# Patient Record
Sex: FEMALE | Race: WHITE
[De-identification: names, ages, dates, MRNs, and addresses within clinical notes are randomized per-mention and may not be internally consistent; named-entity substitution may affect disease eponyms.]

---

## 2018-09-26 ENCOUNTER — HOSPITAL ENCOUNTER (INPATIENT)
Dept: HOSPITAL 96 - M.ERS | Age: 53
LOS: 1 days | Discharge: HOME | DRG: 603 | End: 2018-09-27
Attending: INTERNAL MEDICINE | Admitting: INTERNAL MEDICINE
Payer: MEDICAID

## 2018-09-26 VITALS — HEIGHT: 64.02 IN | WEIGHT: 215 LBS | BODY MASS INDEX: 36.7 KG/M2

## 2018-09-26 VITALS — DIASTOLIC BLOOD PRESSURE: 52 MMHG | SYSTOLIC BLOOD PRESSURE: 109 MMHG

## 2018-09-26 VITALS — DIASTOLIC BLOOD PRESSURE: 74 MMHG | SYSTOLIC BLOOD PRESSURE: 132 MMHG

## 2018-09-26 VITALS — DIASTOLIC BLOOD PRESSURE: 63 MMHG | SYSTOLIC BLOOD PRESSURE: 131 MMHG

## 2018-09-26 DIAGNOSIS — Z53.29: ICD-10-CM

## 2018-09-26 DIAGNOSIS — Z88.0: ICD-10-CM

## 2018-09-26 DIAGNOSIS — F17.210: ICD-10-CM
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
## 2018-09-26 DIAGNOSIS — Z88.2: ICD-10-CM

## 2018-09-26 DIAGNOSIS — Z90.722: ICD-10-CM

## 2018-09-26 DIAGNOSIS — I10: ICD-10-CM
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
## 2018-09-26 DIAGNOSIS — L03.115: Primary | ICD-10-CM

## 2018-09-26 DIAGNOSIS — Z87.440: ICD-10-CM

## 2018-09-26 DIAGNOSIS — Z90.710: ICD-10-CM

## 2018-09-26 DIAGNOSIS — Z79.4: ICD-10-CM

## 2018-09-26 DIAGNOSIS — Z79.82: ICD-10-CM

## 2018-09-26 DIAGNOSIS — Z91.013: ICD-10-CM

## 2018-09-26 DIAGNOSIS — Z91.041: ICD-10-CM

## 2018-09-26 DIAGNOSIS — S00.83XA: ICD-10-CM

## 2018-09-26 DIAGNOSIS — R65.10: ICD-10-CM

## 2018-09-26 DIAGNOSIS — Z88.8: ICD-10-CM

## 2018-09-26 DIAGNOSIS — E87.1: ICD-10-CM

## 2018-09-26 DIAGNOSIS — M54.9: ICD-10-CM

## 2018-09-26 DIAGNOSIS — E10.65: ICD-10-CM

## 2018-09-26 DIAGNOSIS — G89.29: ICD-10-CM

## 2018-09-26 DIAGNOSIS — Z86.14: ICD-10-CM

## 2018-09-26 DIAGNOSIS — Z88.1: ICD-10-CM

## 2018-09-26 DIAGNOSIS — E03.9: ICD-10-CM

## 2018-09-26 DIAGNOSIS — G40.909: ICD-10-CM
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
## 2018-09-26 DIAGNOSIS — Z79.899: ICD-10-CM

## 2018-09-26 LAB
ABSOLUTE BASOPHILS: 0 THOU/UL (ref 0–0.2)
ABSOLUTE EOSINOPHILS: 0.1 THOU/UL (ref 0–0.7)
ABSOLUTE MONOCYTES: 0.6 THOU/UL (ref 0–1.2)
ALBUMIN SERPL-MCNC: 2.1 G/DL (ref 3.4–5)
ALP SERPL-CCNC: 165 U/L (ref 46–116)
ALT SERPL-CCNC: 29 U/L (ref 30–65)
ANION GAP SERPL CALC-SCNC: 6 MMOL/L (ref 7–16)
AST SERPL-CCNC: 27 U/L (ref 15–37)
BASOPHILS NFR BLD AUTO: 0.2 %
BILIRUB SERPL-MCNC: 1.8 MG/DL
BILIRUB UR-MCNC: NEGATIVE MG/DL
BUN SERPL-MCNC: 25 MG/DL (ref 7–18)
CALCIUM SERPL-MCNC: 9.4 MG/DL (ref 8.5–10.1)
CHLORIDE SERPL-SCNC: 97 MMOL/L (ref 98–107)
CO2 SERPL-SCNC: 31 MMOL/L (ref 21–32)
COLOR UR: YELLOW
CREAT SERPL-MCNC: 1.6 MG/DL (ref 0.6–1.3)
EOSINOPHIL NFR BLD: 1.4 %
GLUCOSE SERPL-MCNC: 396 MG/DL (ref 70–99)
GRANULOCYTES NFR BLD MANUAL: 75.8 %
HCT VFR BLD CALC: 41.2 % (ref 37–47)
HGB BLD-MCNC: 14.3 GM/DL (ref 12–15)
KETONES UR STRIP-MCNC: NEGATIVE MG/DL
LYMPHOCYTES # BLD: 1.7 THOU/UL (ref 0.8–5.3)
LYMPHOCYTES NFR BLD AUTO: 16.5 %
MCH RBC QN AUTO: 31.9 PG (ref 26–34)
MCHC RBC AUTO-ENTMCNC: 34.7 G/DL (ref 28–37)
MCV RBC: 92.1 FL (ref 80–100)
MONOCYTES NFR BLD: 6.1 %
MPV: 7.8 FL. (ref 7.2–11.1)
NEUTROPHILS # BLD: 7.6 THOU/UL (ref 1.6–8.1)
NUCLEATED RBCS: 0 /100WBC
OPIATES UR-MCNC: POSITIVE NG/ML
PLATELET COUNT*: 134 THOU/UL (ref 150–400)
POTASSIUM SERPL-SCNC: 4.2 MMOL/L (ref 3.5–5.1)
PROT SERPL-MCNC: 7.1 G/DL (ref 6.4–8.2)
PROT UR QL STRIP: NEGATIVE
RBC # BLD AUTO: 4.47 MIL/UL (ref 4.2–5)
RBC # UR STRIP: (no result) /UL
RDW-CV: 14.1 % (ref 10.5–14.5)
SODIUM SERPL-SCNC: 134 MMOL/L (ref 136–145)
SP GR UR STRIP: 1.01 (ref 1–1.03)
URINE CLARITY: CLEAR
URINE GLUCOSE-RANDOM: (no result)
URINE LEUKOCYTES-REFLEX: NEGATIVE
URINE NITRITE-REFLEX: NEGATIVE
UROBILINOGEN UR STRIP-ACNC: 0.2 E.U./DL (ref 0.2–1)
WBC # BLD AUTO: 10 THOU/UL (ref 4–11)

## 2018-09-26 NOTE — NUR
ADMITTED TO [facility] THIS AFTERNOON, ORIENTED TO ROOM AND CALL LIGHT.  UA/UDS SENT
TO LAB, ELEVATED LACTATE REPORTED TO SHANTEL, ONE LITER GIVEN, FRANCO IV VANC
WELL W/O S/S ADR. VSS, CARE PLAN REVIEWED WITH PATIENT, DENIES QUESTIONS AT
THIS TIME.

## 2018-09-27 VITALS — DIASTOLIC BLOOD PRESSURE: 49 MMHG | SYSTOLIC BLOOD PRESSURE: 101 MMHG

## 2018-09-27 VITALS — DIASTOLIC BLOOD PRESSURE: 41 MMHG | SYSTOLIC BLOOD PRESSURE: 103 MMHG

## 2018-09-27 VITALS — SYSTOLIC BLOOD PRESSURE: 101 MMHG | DIASTOLIC BLOOD PRESSURE: 49 MMHG

## 2018-09-27 LAB
ABSOLUTE BASOPHILS: 0.1 THOU/UL (ref 0–0.2)
ABSOLUTE EOSINOPHILS: 0.4 THOU/UL (ref 0–0.7)
ABSOLUTE MONOCYTES: 0.9 THOU/UL (ref 0–1.2)
ANION GAP SERPL CALC-SCNC: 3 MMOL/L (ref 7–16)
BASOPHILS NFR BLD AUTO: 0.9 %
BUN SERPL-MCNC: 23 MG/DL (ref 7–18)
CALCIUM SERPL-MCNC: 8.3 MG/DL (ref 8.5–10.1)
CHLORIDE SERPL-SCNC: 104 MMOL/L (ref 98–107)
CO2 SERPL-SCNC: 31 MMOL/L (ref 21–32)
CREAT SERPL-MCNC: 1.3 MG/DL (ref 0.6–1.3)
EOSINOPHIL NFR BLD: 3.4 %
GLUCOSE SERPL-MCNC: 86 MG/DL (ref 70–99)
GRANULOCYTES NFR BLD MANUAL: 65.7 %
HCT VFR BLD CALC: 38.7 % (ref 37–47)
HGB BLD-MCNC: 13.2 GM/DL (ref 12–15)
LYMPHOCYTES # BLD: 2.8 THOU/UL (ref 0.8–5.3)
LYMPHOCYTES NFR BLD AUTO: 22.9 %
MCH RBC QN AUTO: 31.3 PG (ref 26–34)
MCHC RBC AUTO-ENTMCNC: 34.1 G/DL (ref 28–37)
MCV RBC: 91.7 FL (ref 80–100)
MONOCYTES NFR BLD: 7.1 %
MPV: 7.8 FL. (ref 7.2–11.1)
NEUTROPHILS # BLD: 8.1 THOU/UL (ref 1.6–8.1)
NUCLEATED RBCS: 0 /100WBC
PLATELET COUNT*: 126 THOU/UL (ref 150–400)
POTASSIUM SERPL-SCNC: 3.7 MMOL/L (ref 3.5–5.1)
RBC # BLD AUTO: 4.22 MIL/UL (ref 4.2–5)
RDW-CV: 14.2 % (ref 10.5–14.5)
SODIUM SERPL-SCNC: 138 MMOL/L (ref 136–145)
WBC # BLD AUTO: 12.3 THOU/UL (ref 4–11)

## 2018-09-27 NOTE — NUR
PATIENT HAS BEEN ALERT AND ORIENTED TODAY VERY PLEASANT, SOME CONCERNS WITH
GOING HOME, "HAS THINGS TO DO AT HOME." PATIENT HAS PAIN THAT IS CONTROLLED
WITH ORAL PAIN MEDICATIONS. PATIENT IS BEING DISCHARGED TO HOME. DISCHARGE
INSTRUCTIONS AND PRESCRIPTIONS GIVEN, WITH QUESTIONS ANSWERED. LEFT VIA
WHEELCHAIR TO HOME.

## 2018-09-27 NOTE — NUR
PATIENT SLEPT MOST OF THE NIGHT. IV FLUIDS CONTINUE TO INFUSE AT 80 ML/HR.
PATIENT WAS GIVEN PAIN MEDICINE THREE TIMES THIS SHIFT. WILL CONTINUE TO
MONITOR.
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
## 2018-09-28 NOTE — CON
[address]  [address]                    CONSULTATION                  
_______________________________________________________________________________
 
Name:       KATELIN KHAN        Room:           [address] [place]  
..#:  [id number]      Account #:      [id number]  
Admission:  09/26/18     Attend Phys:    Rolando Harvey MD 
Discharge:  09/27/18     Date of Birth:  [birth year]  
         Report #: [id number]
                                                                     [id number]  
_______________________________________________________________________________
THIS REPORT FOR:  //name//                      
 
CC: Rolando Modi [place]
 
DATE OF SERVICE:  09/27/2018
 
 
Infectious Disease Consultation
 
ATTENDING PHYSICIAN:  Rolando Harvey MD
 
REASON FOR EVALUATION:  Right lower extremity skin and soft tissue infection
with cellulitis.
 
HISTORY OF PRESENT ILLNESS:  Chart reviewed, patient examined.  This is a
53-year-old woman with diabetes mellitus type 1 diagnosed at age 17, has ongoing
issues with right lower extremity inflammatory eruption.  She was hospitalized
recently, was discharged on IV vancomycin.  She sustained an injury result of a
fall forward, striking her pretibial site developed an ulceration, worsened
pain, inflammatory red signs and symptoms.  She was continued on IV antibiotics.
 It is unclear if she has had fevers or significant pulmonary or
gastrointestinal related complaints.  She is not encephalopathic.
 
ALLERGIES:  Numerous including CONTRAST DYE, TRAMADOL, PENICILLIN, SULFA,
HALDOL, TETRACYCLINE, TRAMADOL, KETOROLAC.
 
CURRENT MEDICINES:  Include guaifenesin, spironolactone, aspirin, levothyroxine,
pantoprazole, vancomycin, budesonide, rifaximin, analgesics, and antiemetics.
 
PAST MEDICAL HISTORY:  Diabetes mellitus type 1, history of recurrent urinary
tract infections, hypothyroidism, hypertension, renal insufficiency, chronic
back pain, seizures, and hernia repair.
 
SOCIAL HISTORY:  A 25 pack-year smoker.  No ethanol, no illicit drug use.
 
FAMILY HISTORY:  Noncontributory.
 
REVIEW OF SYSTEMS:  As above.
 
PHYSICAL EXAMINATION:
GENERAL:  Mild-to-moderate distress secondary to the left lower extremity pain,
appears somewhat chronically ill.  She is pleasant and cooperative, moderate
distress.
VITAL SIGNS:  Temperature 98.5, pulse 69, respirations 16, blood pressure
101/49.
 
 
 
[place] [address]                    CONSULTATION                  
_______________________________________________________________________________
 
Name:       KATELIN KHAN        Room:           [address]#:  [id number]      Account #:      [id number]  
Admission:  09/26/18     Attend Phys:    Rolando Harvey MD 
Discharge:  09/27/18     Date of Birth:  [birth year]  
         Report #: [id number]
                                                                     [id number]  
_______________________________________________________________________________
SKIN:  Warm, dry.
HEENT:  Unremarkable.
NECK:  Supple.
LUNGS:  Clear to auscultation.
HEART:  Regular.  I do not appreciate murmur.
ABDOMEN:  Soft, obese, nontender.
EXTREMITIES:  Right lower extremity has moderate-to-marked inflammatory changes
noted to below the knee.  She has got an ulceration involving the junction of
the distal medial third of her pretibial site anterior aspect.  There is
associated tenderness.  There is eschar.
GENITOURINARY:  Deferred.
RECTAL:  Deferred.
 
LABORATORY DATA:  Blood cultures sterile thus far.  Most recent CBC:  White
count 12.3, hemoglobin 13.2, hematocrit 38.7, platelets of 126.  Electrolytes: 
Sodium 138, potassium 3.7, chloride 104, bicarbonate is 31, anion gap of 3, BUN
and creatinine 23 and 1.3.  Urine culture was unremarkable.  Lactic peaked at
3.0 initially.
 
IMAGING DATA:  X-ray of the tib-fib was otherwise unremarkable for any osseous
abnormalities.
 
ASSESSMENT:  Right lower extremity skin and soft tissue infection, cellulitis. 
The patient had multiple hypersensitivities.  We will continue therapy with
linezolid.  At this point we will; however, add compression to the right lower
extremity.  She was encouraged to stay off her feet as much as possible.  I
guess given her social situation, she really needs to be discharged.  She does
assure me that there is people around that could attend to some of her needs. 
We will see her in the office in followup.
 
 
 
 
 
 
 
 
 
 
 
 
 
 
 
<ELECTRONICALLY SIGNED>
                                        By:  Mookie Serrano MD           
09/28/18     0756
D: 09/27/18 1612_______________________________________
T: 09/27/18 2304Jochung Serrano MD              /nt

## 2018-11-12 ENCOUNTER — HOSPITAL ENCOUNTER (INPATIENT)
Dept: HOSPITAL 96 - M.ERS | Age: 53
LOS: 3 days | Discharge: HOME HEALTH | DRG: 372 | End: 2018-11-15
Attending: INTERNAL MEDICINE | Admitting: INTERNAL MEDICINE
Payer: MEDICAID

## 2018-11-12 VITALS — SYSTOLIC BLOOD PRESSURE: 129 MMHG | DIASTOLIC BLOOD PRESSURE: 48 MMHG

## 2018-11-12 VITALS — DIASTOLIC BLOOD PRESSURE: 59 MMHG | SYSTOLIC BLOOD PRESSURE: 113 MMHG

## 2018-11-12 VITALS — BODY MASS INDEX: 40.75 KG/M2 | WEIGHT: 230 LBS | HEIGHT: 62.99 IN

## 2018-11-12 DIAGNOSIS — Z86.14: ICD-10-CM

## 2018-11-12 DIAGNOSIS — Z88.0: ICD-10-CM

## 2018-11-12 DIAGNOSIS — Z88.2: ICD-10-CM

## 2018-11-12 DIAGNOSIS — I13.0: ICD-10-CM

## 2018-11-12 DIAGNOSIS — Z16.23: ICD-10-CM

## 2018-11-12 DIAGNOSIS — Z88.8: ICD-10-CM

## 2018-11-12 DIAGNOSIS — Z91.041: ICD-10-CM

## 2018-11-12 DIAGNOSIS — Z28.21: ICD-10-CM

## 2018-11-12 DIAGNOSIS — Z79.899: ICD-10-CM

## 2018-11-12 DIAGNOSIS — Z79.82: ICD-10-CM

## 2018-11-12 DIAGNOSIS — Z90.710: ICD-10-CM

## 2018-11-12 DIAGNOSIS — M54.9: ICD-10-CM

## 2018-11-12 DIAGNOSIS — Z82.49: ICD-10-CM

## 2018-11-12 DIAGNOSIS — E11.51: ICD-10-CM

## 2018-11-12 DIAGNOSIS — N18.2: ICD-10-CM

## 2018-11-12 DIAGNOSIS — Z79.4: ICD-10-CM

## 2018-11-12 DIAGNOSIS — M79.3: ICD-10-CM

## 2018-11-12 DIAGNOSIS — E86.0: ICD-10-CM

## 2018-11-12 DIAGNOSIS — E03.9: ICD-10-CM

## 2018-11-12 DIAGNOSIS — Z91.013: ICD-10-CM

## 2018-11-12 DIAGNOSIS — G89.29: ICD-10-CM

## 2018-11-12 DIAGNOSIS — L03.115: ICD-10-CM

## 2018-11-12 DIAGNOSIS — Z88.5: ICD-10-CM

## 2018-11-12 DIAGNOSIS — K72.90: ICD-10-CM

## 2018-11-12 DIAGNOSIS — I50.9: ICD-10-CM

## 2018-11-12 DIAGNOSIS — N39.0: ICD-10-CM

## 2018-11-12 DIAGNOSIS — A04.72: Primary | ICD-10-CM

## 2018-11-12 DIAGNOSIS — E66.9: ICD-10-CM

## 2018-11-12 LAB
ABSOLUTE BASOPHILS: 0.2 THOU/UL (ref 0–0.2)
ABSOLUTE EOSINOPHILS: 0.4 THOU/UL (ref 0–0.7)
ABSOLUTE MONOCYTES: 0.6 THOU/UL (ref 0–1.2)
ALBUMIN SERPL-MCNC: 1.8 G/DL (ref 3.4–5)
ALP SERPL-CCNC: 127 U/L (ref 46–116)
ALT SERPL-CCNC: 16 U/L (ref 30–65)
ANION GAP SERPL CALC-SCNC: 8 MMOL/L (ref 7–16)
APTT BLD: 25.6 SECONDS (ref 25–31.3)
AST SERPL-CCNC: 29 U/L (ref 15–37)
BASOPHILS NFR BLD AUTO: 1.4 %
BILIRUB SERPL-MCNC: 1.3 MG/DL
BUN SERPL-MCNC: 13 MG/DL (ref 7–18)
CALCIUM SERPL-MCNC: 8.4 MG/DL (ref 8.5–10.1)
CHLORIDE SERPL-SCNC: 106 MMOL/L (ref 98–107)
CO2 SERPL-SCNC: 23 MMOL/L (ref 21–32)
CREAT SERPL-MCNC: 0.9 MG/DL (ref 0.6–1.3)
EOSINOPHIL NFR BLD: 2.7 %
GLUCOSE SERPL-MCNC: 133 MG/DL (ref 70–99)
GRANULOCYTES NFR BLD MANUAL: 73.7 %
HCT VFR BLD CALC: 35.5 % (ref 37–47)
HGB BLD-MCNC: 11.9 GM/DL (ref 12–15)
INR PPP: 1.5
LIPASE: 191 U/L (ref 73–393)
LYMPHOCYTES # BLD: 2.3 THOU/UL (ref 0.8–5.3)
LYMPHOCYTES NFR BLD AUTO: 17.3 %
MCH RBC QN AUTO: 30.6 PG (ref 26–34)
MCHC RBC AUTO-ENTMCNC: 33.5 G/DL (ref 28–37)
MCV RBC: 91.2 FL (ref 80–100)
MONOCYTES NFR BLD: 4.9 %
MPV: 8.3 FL. (ref 7.2–11.1)
NEUTROPHILS # BLD: 9.7 THOU/UL (ref 1.6–8.1)
NT-PRO BRAIN NAT PEPTIDE: 458 PG/ML (ref ?–300)
NUCLEATED RBCS: 0 /100WBC
PLATELET COUNT*: 81 THOU/UL (ref 150–400)
POTASSIUM SERPL-SCNC: 4.2 MMOL/L (ref 3.5–5.1)
PROT SERPL-MCNC: 5.9 G/DL (ref 6.4–8.2)
PROTHROMBIN TIME: 15.4 SECONDS (ref 9.2–11.5)
RBC # BLD AUTO: 3.9 MIL/UL (ref 4.2–5)
RDW-CV: 15.6 % (ref 10.5–14.5)
SODIUM SERPL-SCNC: 137 MMOL/L (ref 136–145)
TROPONIN-I LEVEL: <0.06 NG/ML (ref ?–0.06)
WBC # BLD AUTO: 13.2 THOU/UL (ref 4–11)

## 2018-11-13 VITALS — DIASTOLIC BLOOD PRESSURE: 80 MMHG | SYSTOLIC BLOOD PRESSURE: 134 MMHG

## 2018-11-13 VITALS — DIASTOLIC BLOOD PRESSURE: 68 MMHG | SYSTOLIC BLOOD PRESSURE: 126 MMHG

## 2018-11-13 VITALS — SYSTOLIC BLOOD PRESSURE: 131 MMHG | DIASTOLIC BLOOD PRESSURE: 74 MMHG

## 2018-11-13 LAB
ALBUMIN SERPL-MCNC: 1.8 G/DL (ref 3.4–5)
ALP SERPL-CCNC: 123 U/L (ref 46–116)
ALT SERPL-CCNC: 16 U/L (ref 30–65)
ANION GAP SERPL CALC-SCNC: 10 MMOL/L (ref 7–16)
AST SERPL-CCNC: 28 U/L (ref 15–37)
BILIRUB SERPL-MCNC: 1.3 MG/DL
BUN SERPL-MCNC: 13 MG/DL (ref 7–18)
CALCIUM SERPL-MCNC: 8.2 MG/DL (ref 8.5–10.1)
CHLORIDE SERPL-SCNC: 107 MMOL/L (ref 98–107)
CO2 SERPL-SCNC: 23 MMOL/L (ref 21–32)
CREAT SERPL-MCNC: 0.8 MG/DL (ref 0.6–1.3)
GLUCOSE SERPL-MCNC: 87 MG/DL (ref 70–99)
HCT VFR BLD CALC: 34.7 % (ref 37–47)
HGB BLD-MCNC: 11.6 GM/DL (ref 12–15)
MCH RBC QN AUTO: 30.8 PG (ref 26–34)
MCHC RBC AUTO-ENTMCNC: 33.3 G/DL (ref 28–37)
MCV RBC: 92.5 FL (ref 80–100)
MPV: 8.7 FL. (ref 7.2–11.1)
PLATELET COUNT*: 88 THOU/UL (ref 150–400)
POTASSIUM SERPL-SCNC: 3.9 MMOL/L (ref 3.5–5.1)
PROT SERPL-MCNC: 5.6 G/DL (ref 6.4–8.2)
RBC # BLD AUTO: 3.76 MIL/UL (ref 4.2–5)
RDW-CV: 15.8 % (ref 10.5–14.5)
SODIUM SERPL-SCNC: 140 MMOL/L (ref 136–145)
WBC # BLD AUTO: 15 THOU/UL (ref 4–11)

## 2018-11-13 NOTE — NUR
PATIENT HAS BEEN ALERT AND ORIENTED TODAY. UNCOOPERATIVE WITH CARE, REFUSING
TEST AND IV. VITAL SIGNS ARE STABLE ON ROOM AIR, UP AD JORGE IN ROOM, EDEMA IN
LOWER EXTREMETIES. CLEAR LIQUID DIET, PATIENT SAYS NOT TOLERATING WELL ENOUGH
TO ADVANCE. CALL LIGHT IS IN REACH, WILL CONTINUE TO MONITOR.

## 2018-11-13 NOTE — EKG
[place] [address]
Phone:  [phone]                     ELECTROCARDIOGRAM REPORT      
_______________________________________________________________________________
 
Name:       KATELIN LARSON                Room:           [address]    [place]  
.R.#:  [id number]      Account #:      [id number]  
Admission:  18     Attend Phys:    ELIJAH Rasmussen
Discharge:               Date of Birth:  [birth year]  
         Report #: [id number]
    [id number]
_______________________________________________________________________________
THIS REPORT FOR:  //name//                      
 
                         [facility] ED
                                       
Test Date:    2018               Test Time:    15:17:02
Pat Name:     KATELIN LARSON              Department:   
Patient ID:   [id number]            Room:         [facility]
Gender:       F                        Technician:   LORIE
:          [id number]               Requested By: Elizabeth Mcmullen
Order Number: [id number] MD:   Raj Mccall
                                 Measurements
Intervals                              Axis          
Rate:         80                       P:            81
KY:           162                      QRS:          48
QRSD:         98                       T:            60
QT:           390                                    
QTc:          450                                    
                           Interpretive Statements
Sinus rhythm
Probable left atrial enlargement
Low voltage, precordial leads
Baseline wander in lead(s) V3
Compared to ECG 2012 16:51:30
Low QRS voltage now present
 
Electronically Signed On 2018 11:21:04 CST by Raj Mccall
[email or web address]
 
 
 
 
 
 
 
 
 
 
 
 
 
 
 
 
  <ELECTRONICALLY SIGNED>
                                           By: Raj Mccall MD, FAC   
  18     1121
D: 18 1517   _____________________________________
T: 18 1517   Raj Mccall MD, FAC     /EPI

## 2018-11-13 NOTE — NUR
SW attempted to meet pt to complete initial assessment but pt was in the
shower.  From previous record, pt lived in an apt independently and has
family/friend support nearby.  SW to continue to follow to assist with safe dc
planning.

## 2018-11-14 VITALS — SYSTOLIC BLOOD PRESSURE: 112 MMHG | DIASTOLIC BLOOD PRESSURE: 48 MMHG

## 2018-11-14 VITALS — SYSTOLIC BLOOD PRESSURE: 121 MMHG | DIASTOLIC BLOOD PRESSURE: 53 MMHG

## 2018-11-14 VITALS — SYSTOLIC BLOOD PRESSURE: 108 MMHG | DIASTOLIC BLOOD PRESSURE: 34 MMHG
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
## 2018-11-14 NOTE — NUR
NEW IV STARTED IN RT THUMB FOR PATIENT.  PT GIVEN MORPHINE 2MG IV FOR
ABDOMINAL PAIN RATING IT 6-7/10.  PT SLEPT THE REST OF THE NIGHT.  PT REFUSING
AM LABS.  PT UP AD JORGE IN ROOM.  PT SAID SHE HAD THREE EPISODES OF LOOSE
STOOLS SINCE ADMISSION.  FREQUENTLY USED ITEMS AND CALL LIGHT WITHIN REACH.
SIDERAILS UPX2.  WILL CONTINUE TO MONITOR.

## 2018-11-15 VITALS — DIASTOLIC BLOOD PRESSURE: 44 MMHG | SYSTOLIC BLOOD PRESSURE: 104 MMHG

## 2018-11-15 VITALS — SYSTOLIC BLOOD PRESSURE: 104 MMHG | DIASTOLIC BLOOD PRESSURE: 44 MMHG

## 2018-11-15 LAB
EST. AVERAGE GLUCOSE BLD GHB EST-MCNC: 186 MG/DL
GLYCOHEMOGLOBIN (HGB A1C): 8.1 % (ref 4.8–5.6)

## 2018-11-15 NOTE — NUR
PATIENT DISCHARGED TO HOME. DISCHARGE PAPERS REVIEWED AND SIGNED.
PRESCRIPTIONS AND INFORMATION SHEETS GIVEN. VANCOMYCIN CALLED TO PHARMACY AND
IS AVAILABLE AT [facility]. IV REMOVED. PATIENT ASSISTED WITH BELONGINGS
AND CLOTHING. PATIENT DENIES ANY FURTHER NEEDS. PATIENT TAKEN BY WHEELCHAIR TO
EXIT. LEFT WITH FAMILY FREIND.

## 2018-11-15 NOTE — NUR
PATIENT SLEPT WELL DURING THIS SHIFT.  PT REQUESTED PAIN MEDICATION X1 AND
RECEIVED OXYIR 5MG.  PT RETURNED TO SLEEP AFTER THIS.  PT REFUSED HUMALOG WITH
HS MEDICATIONS BUT TOOK LANTUS 30UNITS.  PT UP AD JORGE IN ROOM.  PT DENIES ANY
BOWEL MOVEMENTS DURING THIS SHIFT.  PT WITH ANTIBIOTICS INFUSING IN IV IN RT
THUMB PER DR ORDER.  PT REFUSING LABS THIS MORNING.  PT REMAINS IN SPECIAL
CONTACT ISOLATION.  FREQUENTLY USED ITEMS AND CALL LIGHT WITHIN REACH.
SIDERAILS UPX2.  WILL CONTINUE TO MONITOR.

## 2018-11-15 NOTE — NUR
SW met with pt to complete initial assessment, introduce self, and SW role.
Pt alert, oriented.  Pt continues to lives in an apt independently. Pt has
support in a sister and a brother who both live in the area.  Pt did not
express any needs at this time.  SW to continue to follow to assist with safe
dc planning.

## 2018-11-19 NOTE — NUR
NOTIFIED EARLIER BY MED STAFF OFFICE THAT THEY WERE CALLED THAT PT'S RX
COULDN'T BE FILLED AT HER PHARMACY (PT DISCHARGED ON 11/15). CALLED [facility] [phone]. THEY ARE UNABLE TO FILL RX WRITTEN BY DR MONTENEGRO AS HE
APPARENTLY HAS NOT COMPLETED SOME PAPERWORK REQUIRED BY MEDICAID. DR. CAZARES
NOTIFIED AND OKAY TO FILL THE SAME RX USING HIS NAME. PHARMACY SAID THEY CAN
DO THAT, THEY WILL CONTACT THE PT ONCE THEY ARE READY TO BE PICKED UP. CALLED
PT ([phone]) TO NOTIFY HER. SHE SAID SHE THOUGHT HER SISTER COULD GO PICK
THEM UP TOMORROW FOR HER. SHE SAID SHE HAD JUST ENOUGH INSULIN AT HOME TO
MANAGE WITHOUT THE NEW RX. ENCOURAGED HER TO SEE IF THERE WAS SOMEONE THAT
COULD GO  HER RX TODAY SO SHE COULD START TAKING ALL OF THEM.